# Patient Record
Sex: MALE | URBAN - METROPOLITAN AREA
[De-identification: names, ages, dates, MRNs, and addresses within clinical notes are randomized per-mention and may not be internally consistent; named-entity substitution may affect disease eponyms.]

---

## 2018-07-18 ENCOUNTER — EMERGENCY (EMERGENCY)
Facility: HOSPITAL | Age: 53
LOS: 1 days | Discharge: HOME | End: 2018-07-18
Attending: EMERGENCY MEDICINE | Admitting: EMERGENCY MEDICINE
Payer: OTHER MISCELLANEOUS

## 2018-07-18 VITALS
DIASTOLIC BLOOD PRESSURE: 103 MMHG | RESPIRATION RATE: 20 BRPM | OXYGEN SATURATION: 99 % | WEIGHT: 220.02 LBS | TEMPERATURE: 98 F | HEART RATE: 63 BPM | SYSTOLIC BLOOD PRESSURE: 160 MMHG

## 2018-07-18 PROCEDURE — 73562 X-RAY EXAM OF KNEE 3: CPT | Mod: 26,RT

## 2018-07-18 PROCEDURE — 99283 EMERGENCY DEPT VISIT LOW MDM: CPT | Mod: 25

## 2018-07-18 RX ORDER — IBUPROFEN 200 MG
600 TABLET ORAL ONCE
Refills: 0 | Status: COMPLETED | OUTPATIENT
Start: 2018-07-18 | End: 2018-07-18

## 2018-07-18 RX ADMIN — Medication 600 MILLIGRAM(S): at 19:51

## 2018-07-18 NOTE — ED PROVIDER NOTE - DIAGNOSTIC INTERPRETATION
ER Physician: Shira Mckeon   INTERPRETATION:  no acute fracture; no soft tissue swelling noted; +ARTHRITIS

## 2018-07-18 NOTE — ED PROVIDER NOTE - OBJECTIVE STATEMENT
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 No f/c, no redness.

## 2018-07-18 NOTE — ED ADULT TRIAGE NOTE - CHIEF COMPLAINT QUOTE
pt c/o knee pain, denies falls or recent injuries,. full ROM . pt reports hx htn, took his daily meds today.

## 2018-07-18 NOTE — ED ADULT NURSE NOTE - OBJECTIVE STATEMENT
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 Patient denies any fall/injuries, fevers. Tenderness on palpation.

## 2018-07-18 NOTE — ED PROVIDER NOTE - MEDICAL DECISION MAKING DETAILS
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 XR no fracture, +arthritis, recommend RICE and ortho/PMD follow up.

## 2018-07-18 NOTE — ED PROVIDER NOTE - PHYSICAL EXAMINATION
GEN: Well appearing, well nourished, awake, alert, oriented to person, place, time/situation and in no apparent distress.  ENT: Airway patent, Nasal mucosa clear. Mouth with normal mucosa.  EYES: Clear bilaterally.  RESPIRATORY: Breathing comfortably with normal RR.  CARDIAC: Regular rate and rhythm  ABDOMEN: Soft, nontender, +bowel sounds, no rebound, rigidity, or guarding.  MSK: Range of motion is not limited, no deformities noted. Mild TTP anteriorly, No effusion, no lig instability, compartments soft, no calf TTP, NVI with +2 pulses.  NEURO: Alert and oriented, no focal deficits.  SKIN: Skin normal color for race, warm, dry and intact. No evidence of rash.  PSYCH: Alert and oriented to person, place, time/situation. normal mood and affect. no apparent risk to self or others.

## 2018-07-19 PROBLEM — Z00.00 ENCOUNTER FOR PREVENTIVE HEALTH EXAMINATION: Status: ACTIVE | Noted: 2018-07-19

## 2018-07-22 DIAGNOSIS — M25.561 PAIN IN RIGHT KNEE: ICD-10-CM

## 2018-07-22 DIAGNOSIS — M19.90 UNSPECIFIED OSTEOARTHRITIS, UNSPECIFIED SITE: ICD-10-CM

## 2018-07-22 DIAGNOSIS — I10 ESSENTIAL (PRIMARY) HYPERTENSION: ICD-10-CM

## 2018-07-24 PROBLEM — Z00.00 ENCOUNTER FOR PREVENTIVE HEALTH EXAMINATION: Noted: 2018-07-24

## 2018-07-25 ENCOUNTER — MESSAGE (OUTPATIENT)
Age: 53
End: 2018-07-25

## 2018-07-26 ENCOUNTER — APPOINTMENT (OUTPATIENT)
Dept: ORTHOPEDIC SURGERY | Facility: CLINIC | Age: 53
End: 2018-07-26
Payer: OTHER MISCELLANEOUS

## 2018-07-26 VITALS — WEIGHT: 220 LBS | BODY MASS INDEX: 31.5 KG/M2 | HEIGHT: 70 IN

## 2018-07-26 PROCEDURE — 73562 X-RAY EXAM OF KNEE 3: CPT | Mod: RT

## 2018-07-26 PROCEDURE — 20610 DRAIN/INJ JOINT/BURSA W/O US: CPT | Mod: RT

## 2018-07-26 PROCEDURE — 99204 OFFICE O/P NEW MOD 45 MIN: CPT | Mod: 25

## 2018-07-26 RX ORDER — METHYLPRED ACET/NACL,ISO-OS/PF 40 MG/ML
40 VIAL (ML) INJECTION
Qty: 1 | Refills: 0 | Status: ACTIVE | COMMUNITY
Start: 2018-07-26

## 2018-08-02 PROCEDURE — 99283 EMERGENCY DEPT VISIT LOW MDM: CPT

## 2018-08-02 PROCEDURE — 99284 EMERGENCY DEPT VISIT MOD MDM: CPT | Mod: 25

## 2018-08-02 PROCEDURE — 73562 X-RAY EXAM OF KNEE 3: CPT

## 2018-08-07 ENCOUNTER — OTHER (OUTPATIENT)
Age: 53
End: 2018-08-07

## 2018-08-07 DIAGNOSIS — M17.11 UNILATERAL PRIMARY OSTEOARTHRITIS, RIGHT KNEE: ICD-10-CM

## 2018-09-02 ENCOUNTER — MOBILE ON CALL (OUTPATIENT)
Age: 53
End: 2018-09-02

## 2018-09-06 ENCOUNTER — APPOINTMENT (OUTPATIENT)
Dept: ORTHOPEDIC SURGERY | Facility: CLINIC | Age: 53
End: 2018-09-06

## 2018-09-26 ENCOUNTER — MESSAGE (OUTPATIENT)
Age: 53
End: 2018-09-26

## 2019-11-25 ENCOUNTER — OUTPATIENT (OUTPATIENT)
Dept: OUTPATIENT SERVICES | Facility: HOSPITAL | Age: 54
LOS: 1 days | End: 2019-11-25
Payer: COMMERCIAL

## 2019-11-25 PROCEDURE — 73560 X-RAY EXAM OF KNEE 1 OR 2: CPT

## 2019-11-25 PROCEDURE — 73560 X-RAY EXAM OF KNEE 1 OR 2: CPT | Mod: 26,RT

## 2019-11-26 ENCOUNTER — TRANSCRIPTION ENCOUNTER (OUTPATIENT)
Age: 54
End: 2019-11-26

## 2019-12-12 RX ORDER — CHLORHEXIDINE GLUCONATE 213 G/1000ML
1 SOLUTION TOPICAL ONCE
Refills: 0 | Status: COMPLETED | OUTPATIENT
Start: 2019-12-13 | End: 2019-12-13

## 2019-12-12 RX ORDER — OXYCODONE HYDROCHLORIDE 5 MG/1
20 TABLET ORAL ONCE
Refills: 0 | Status: DISCONTINUED | OUTPATIENT
Start: 2019-12-13 | End: 2019-12-13

## 2019-12-12 RX ORDER — CELECOXIB 200 MG/1
200 CAPSULE ORAL ONCE
Refills: 0 | Status: COMPLETED | OUTPATIENT
Start: 2019-12-13 | End: 2019-12-13

## 2019-12-12 RX ORDER — POVIDONE-IODINE 5 %
1 AEROSOL (ML) TOPICAL ONCE
Refills: 0 | Status: COMPLETED | OUTPATIENT
Start: 2019-12-13 | End: 2019-12-13

## 2019-12-12 RX ORDER — INFLUENZA VIRUS VACCINE 15; 15; 15; 15 UG/.5ML; UG/.5ML; UG/.5ML; UG/.5ML
0.5 SUSPENSION INTRAMUSCULAR ONCE
Refills: 0 | Status: DISCONTINUED | OUTPATIENT
Start: 2019-12-13 | End: 2019-12-17

## 2019-12-12 NOTE — H&P ADULT - NSHPPHYSICALEXAM_GEN_ALL_CORE
Right knee decreased rom 2/2 pain  Rest of PE per MD clearance General: NAD  PE: RIGHT LE skin intact, no erythema/ecchymosis/sts. SLT INTACT, DP/PT 2+, EHL/TA/GS 5/5. Right knee decreased rom 2/2 pain  Rest of PE per MD clearance

## 2019-12-12 NOTE — PATIENT PROFILE ADULT - NSPROEDALEARNPREF_GEN_A_NUR
written material/individual instruction/verbal instruction individual instruction/skill demonstration/verbal instruction/written material

## 2019-12-12 NOTE — H&P ADULT - PROBLEM SELECTOR PLAN 1
Admit to Ortho  For elective Right TKR  Cleared by Admit to Ortho  For elective Right TKR  Cleared by Dr. Bravo.

## 2019-12-12 NOTE — H&P ADULT - NSICDXPASTSURGICALHX_GEN_ALL_CORE_FT
PAST SURGICAL HISTORY:  History of lumbar laminectomy     History of shoulder surgery right, RCR repair

## 2019-12-12 NOTE — H&P ADULT - NSHPLABSRESULTS_GEN_ALL_CORE
preop cbc/bmp/coags/ua wnl per medical clearance  EKG- RSR in V1 AND V2  Preop chest x-ray wnl per clearance   nares dos

## 2019-12-12 NOTE — H&P ADULT - HISTORY OF PRESENT ILLNESS
54M c/o right knee pain  Presents today for elective right TKR. 54M c/o right knee pain x2y. Pt. denies any injury, but states he has arthritis. Pt. also thinks due to wear and tear of being a . Pt. c/o  pain in right knee with radiation up and down leg. Pt. has increased pain with walking. Pt. has been trying conservative therapies such as ice, Estim, and Tylenol for pain relief. Pt. also had ne cortisone injection July 2018 which helped with pan x 3 months. Denies any walker assistance.  Presents today for elective right TKR.

## 2019-12-13 ENCOUNTER — INPATIENT (INPATIENT)
Facility: HOSPITAL | Age: 54
LOS: 3 days | Discharge: HOME | DRG: 470 | End: 2019-12-17
Attending: ORTHOPAEDIC SURGERY | Admitting: ORTHOPAEDIC SURGERY
Payer: OTHER MISCELLANEOUS

## 2019-12-13 ENCOUNTER — RESULT REVIEW (OUTPATIENT)
Age: 54
End: 2019-12-13

## 2019-12-13 VITALS
DIASTOLIC BLOOD PRESSURE: 90 MMHG | WEIGHT: 219.14 LBS | SYSTOLIC BLOOD PRESSURE: 129 MMHG | HEART RATE: 67 BPM | OXYGEN SATURATION: 97 % | HEIGHT: 70 IN | RESPIRATION RATE: 16 BRPM | TEMPERATURE: 98 F

## 2019-12-13 DIAGNOSIS — Z98.890 OTHER SPECIFIED POSTPROCEDURAL STATES: Chronic | ICD-10-CM

## 2019-12-13 DIAGNOSIS — M17.11 UNILATERAL PRIMARY OSTEOARTHRITIS, RIGHT KNEE: ICD-10-CM

## 2019-12-13 DIAGNOSIS — I10 ESSENTIAL (PRIMARY) HYPERTENSION: ICD-10-CM

## 2019-12-13 LAB
MRSA PCR RESULT.: NEGATIVE — SIGNIFICANT CHANGE UP
S AUREUS DNA NOSE QL NAA+PROBE: POSITIVE

## 2019-12-13 PROCEDURE — 73560 X-RAY EXAM OF KNEE 1 OR 2: CPT | Mod: 26,RT

## 2019-12-13 PROCEDURE — 88300 SURGICAL PATH GROSS: CPT | Mod: 26

## 2019-12-13 RX ORDER — AMLODIPINE BESYLATE 2.5 MG/1
5 TABLET ORAL DAILY
Refills: 0 | Status: DISCONTINUED | OUTPATIENT
Start: 2019-12-13 | End: 2019-12-17

## 2019-12-13 RX ORDER — SODIUM CHLORIDE 9 MG/ML
1000 INJECTION, SOLUTION INTRAVENOUS
Refills: 0 | Status: DISCONTINUED | OUTPATIENT
Start: 2019-12-13 | End: 2019-12-17

## 2019-12-13 RX ORDER — ONDANSETRON 8 MG/1
4 TABLET, FILM COATED ORAL EVERY 6 HOURS
Refills: 0 | Status: DISCONTINUED | OUTPATIENT
Start: 2019-12-13 | End: 2019-12-17

## 2019-12-13 RX ORDER — POLYETHYLENE GLYCOL 3350 17 G/17G
17 POWDER, FOR SOLUTION ORAL DAILY
Refills: 0 | Status: DISCONTINUED | OUTPATIENT
Start: 2019-12-13 | End: 2019-12-17

## 2019-12-13 RX ORDER — OXYCODONE HYDROCHLORIDE 5 MG/1
10 TABLET ORAL EVERY 4 HOURS
Refills: 0 | Status: DISCONTINUED | OUTPATIENT
Start: 2019-12-13 | End: 2019-12-17

## 2019-12-13 RX ORDER — CEFAZOLIN SODIUM 1 G
2000 VIAL (EA) INJECTION EVERY 8 HOURS
Refills: 0 | Status: DISCONTINUED | OUTPATIENT
Start: 2019-12-13 | End: 2019-12-13

## 2019-12-13 RX ORDER — HYDROMORPHONE HYDROCHLORIDE 2 MG/ML
0.5 INJECTION INTRAMUSCULAR; INTRAVENOUS; SUBCUTANEOUS
Refills: 0 | Status: DISCONTINUED | OUTPATIENT
Start: 2019-12-13 | End: 2019-12-17

## 2019-12-13 RX ORDER — OXYCODONE HYDROCHLORIDE 5 MG/1
5 TABLET ORAL EVERY 4 HOURS
Refills: 0 | Status: DISCONTINUED | OUTPATIENT
Start: 2019-12-13 | End: 2019-12-17

## 2019-12-13 RX ORDER — ASPIRIN/CALCIUM CARB/MAGNESIUM 324 MG
325 TABLET ORAL
Refills: 0 | Status: DISCONTINUED | OUTPATIENT
Start: 2019-12-14 | End: 2019-12-17

## 2019-12-13 RX ORDER — BUPIVACAINE 13.3 MG/ML
20 INJECTION, SUSPENSION, LIPOSOMAL INFILTRATION ONCE
Refills: 0 | Status: DISCONTINUED | OUTPATIENT
Start: 2019-12-13 | End: 2019-12-17

## 2019-12-13 RX ORDER — SENNA PLUS 8.6 MG/1
2 TABLET ORAL AT BEDTIME
Refills: 0 | Status: DISCONTINUED | OUTPATIENT
Start: 2019-12-13 | End: 2019-12-17

## 2019-12-13 RX ORDER — HYDROCHLOROTHIAZIDE 25 MG
12.5 TABLET ORAL DAILY
Refills: 0 | Status: DISCONTINUED | OUTPATIENT
Start: 2019-12-13 | End: 2019-12-17

## 2019-12-13 RX ORDER — MAGNESIUM HYDROXIDE 400 MG/1
30 TABLET, CHEWABLE ORAL DAILY
Refills: 0 | Status: DISCONTINUED | OUTPATIENT
Start: 2019-12-13 | End: 2019-12-17

## 2019-12-13 RX ORDER — HYDROMORPHONE HYDROCHLORIDE 2 MG/ML
0.5 INJECTION INTRAMUSCULAR; INTRAVENOUS; SUBCUTANEOUS EVERY 4 HOURS
Refills: 0 | Status: DISCONTINUED | OUTPATIENT
Start: 2019-12-13 | End: 2019-12-17

## 2019-12-13 RX ORDER — ACETAMINOPHEN 500 MG
650 TABLET ORAL EVERY 6 HOURS
Refills: 0 | Status: DISCONTINUED | OUTPATIENT
Start: 2019-12-13 | End: 2019-12-17

## 2019-12-13 RX ORDER — PANTOPRAZOLE SODIUM 20 MG/1
40 TABLET, DELAYED RELEASE ORAL
Refills: 0 | Status: DISCONTINUED | OUTPATIENT
Start: 2019-12-13 | End: 2019-12-17

## 2019-12-13 RX ORDER — VALSARTAN 80 MG/1
160 TABLET ORAL DAILY
Refills: 0 | Status: DISCONTINUED | OUTPATIENT
Start: 2019-12-14 | End: 2019-12-17

## 2019-12-13 RX ORDER — CEFAZOLIN SODIUM 1 G
2000 VIAL (EA) INJECTION EVERY 8 HOURS
Refills: 0 | Status: COMPLETED | OUTPATIENT
Start: 2019-12-13 | End: 2019-12-13

## 2019-12-13 RX ADMIN — Medication 2000 MILLIGRAM(S): at 16:47

## 2019-12-13 RX ADMIN — ONDANSETRON 4 MILLIGRAM(S): 8 TABLET, FILM COATED ORAL at 13:55

## 2019-12-13 RX ADMIN — SODIUM CHLORIDE 100 MILLILITER(S): 9 INJECTION, SOLUTION INTRAVENOUS at 11:26

## 2019-12-13 RX ADMIN — CHLORHEXIDINE GLUCONATE 1 APPLICATION(S): 213 SOLUTION TOPICAL at 05:30

## 2019-12-13 RX ADMIN — CELECOXIB 200 MILLIGRAM(S): 200 CAPSULE ORAL at 07:23

## 2019-12-13 RX ADMIN — AMLODIPINE BESYLATE 5 MILLIGRAM(S): 2.5 TABLET ORAL at 16:58

## 2019-12-13 RX ADMIN — OXYCODONE HYDROCHLORIDE 20 MILLIGRAM(S): 5 TABLET ORAL at 07:27

## 2019-12-13 RX ADMIN — OXYCODONE HYDROCHLORIDE 10 MILLIGRAM(S): 5 TABLET ORAL at 14:47

## 2019-12-13 RX ADMIN — Medication 650 MILLIGRAM(S): at 15:40

## 2019-12-13 RX ADMIN — Medication 2000 MILLIGRAM(S): at 23:27

## 2019-12-13 RX ADMIN — OXYCODONE HYDROCHLORIDE 10 MILLIGRAM(S): 5 TABLET ORAL at 21:11

## 2019-12-13 RX ADMIN — CELECOXIB 200 MILLIGRAM(S): 200 CAPSULE ORAL at 07:27

## 2019-12-13 RX ADMIN — OXYCODONE HYDROCHLORIDE 10 MILLIGRAM(S): 5 TABLET ORAL at 15:40

## 2019-12-13 RX ADMIN — Medication 650 MILLIGRAM(S): at 14:47

## 2019-12-13 RX ADMIN — Medication 100 MILLIGRAM(S): at 07:22

## 2019-12-13 RX ADMIN — OXYCODONE HYDROCHLORIDE 10 MILLIGRAM(S): 5 TABLET ORAL at 22:11

## 2019-12-13 RX ADMIN — Medication 1 APPLICATION(S): at 06:15

## 2019-12-13 RX ADMIN — OXYCODONE HYDROCHLORIDE 20 MILLIGRAM(S): 5 TABLET ORAL at 07:22

## 2019-12-13 NOTE — PROGRESS NOTE ADULT - SUBJECTIVE AND OBJECTIVE BOX
POST OPERATIVE DAY #: 0  STATUS POST: Right knee replacement    SUBJECTIVE: Patient seen and examined. States to mild pain controlled. Patient denies any CP, SOB, fever, chills, numbness/tingling.     Pain:  well controlled      OBJECTIVE:     Vital Signs Last 24 Hrs  T(C): 36.3 (13 Dec 2019 13:45), Max: 36.5 (13 Dec 2019 05:48)  T(F): 97.3 (13 Dec 2019 13:45), Max: 97.7 (13 Dec 2019 05:48)  HR: 60 (13 Dec 2019 13:45) (60 - 78)  BP: 133/92 (13 Dec 2019 13:45) (109/76 - 133/92)  BP(mean): 94 (13 Dec 2019 12:15) (87 - 94)  RR: 15 (13 Dec 2019 13:45) (12 - 20)  SpO2: 100% (13 Dec 2019 13:45) (93% - 100%)    Affected extremity:          Dressing: clean/dry/intact          Sensation: intact to light touch         Motor exam:  5/5 to EHL/TA/GS         warm, well-perfused; capillary refill < 3 seconds              I&O's Detail    13 Dec 2019 07:01  -  13 Dec 2019 15:50  --------------------------------------------------------  IN:    lactated ringers.: 200 mL    Oral Fluid: 220 mL  Total IN: 420 mL    OUT:  Total OUT: 0 mL    Total NET: 420 mL          LABS:                MEDICATIONS:  ceFAZolin  Injectable. 2000 milliGRAM(s) IV Push every 8 hours    acetaminophen   Tablet .. 650 milliGRAM(s) Oral every 6 hours PRN  HYDROmorphone  Injectable 0.5 milliGRAM(s) IV Push every 4 hours PRN  HYDROmorphone  Injectable 0.5 milliGRAM(s) IV Push every 15 minutes PRN  ondansetron Injectable 4 milliGRAM(s) IV Push every 6 hours PRN  oxyCODONE    IR 5 milliGRAM(s) Oral every 4 hours PRN  oxyCODONE    IR 10 milliGRAM(s) Oral every 4 hours PRN        RADIOLOGY & ADDITIONAL STUDIES: s/p Right TKR. Hardware is intact. in good alignment.     ASSESSMENT AND PLAN:     1. Analgesic pain control  2. DVT prophylaxis: ASA       SCDs        3. Continue PT  4. Weight Bearing Status:  Weight bearing as tolerated        5. Disposition: Pending

## 2019-12-13 NOTE — BRIEF OPERATIVE NOTE - NSICDXBRIEFPREOP_GEN_ALL_CORE_FT
PRE-OP DIAGNOSIS:  Primary osteoarthritis of right knee 13-Dec-2019 10:49:51 Primary osteoarthritis of right knee Claudia Goff

## 2019-12-13 NOTE — BRIEF OPERATIVE NOTE - NSICDXBRIEFPOSTOP_GEN_ALL_CORE_FT
POST-OP DIAGNOSIS:  Primary osteoarthritis of right knee 13-Dec-2019 10:49:55 Primary osteoarthritis of right knee Claudia Goff

## 2019-12-14 ENCOUNTER — TRANSCRIPTION ENCOUNTER (OUTPATIENT)
Age: 54
End: 2019-12-14

## 2019-12-14 LAB
ANION GAP SERPL CALC-SCNC: 7 MMOL/L — SIGNIFICANT CHANGE UP (ref 5–17)
BUN SERPL-MCNC: 10 MG/DL — SIGNIFICANT CHANGE UP (ref 7–23)
CALCIUM SERPL-MCNC: 8.5 MG/DL — SIGNIFICANT CHANGE UP (ref 8.4–10.5)
CHLORIDE SERPL-SCNC: 101 MMOL/L — SIGNIFICANT CHANGE UP (ref 96–108)
CO2 SERPL-SCNC: 30 MMOL/L — SIGNIFICANT CHANGE UP (ref 22–31)
CREAT SERPL-MCNC: 0.96 MG/DL — SIGNIFICANT CHANGE UP (ref 0.5–1.3)
GLUCOSE SERPL-MCNC: 91 MG/DL — SIGNIFICANT CHANGE UP (ref 70–99)
HCT VFR BLD CALC: 37.2 % — LOW (ref 39–50)
HGB BLD-MCNC: 11.9 G/DL — LOW (ref 13–17)
MCHC RBC-ENTMCNC: 28.9 PG — SIGNIFICANT CHANGE UP (ref 27–34)
MCHC RBC-ENTMCNC: 32 GM/DL — SIGNIFICANT CHANGE UP (ref 32–36)
MCV RBC AUTO: 90.3 FL — SIGNIFICANT CHANGE UP (ref 80–100)
NRBC # BLD: 0 /100 WBCS — SIGNIFICANT CHANGE UP (ref 0–0)
PLATELET # BLD AUTO: 175 K/UL — SIGNIFICANT CHANGE UP (ref 150–400)
POTASSIUM SERPL-MCNC: 3.8 MMOL/L — SIGNIFICANT CHANGE UP (ref 3.5–5.3)
POTASSIUM SERPL-SCNC: 3.8 MMOL/L — SIGNIFICANT CHANGE UP (ref 3.5–5.3)
RBC # BLD: 4.12 M/UL — LOW (ref 4.2–5.8)
RBC # FLD: 13.1 % — SIGNIFICANT CHANGE UP (ref 10.3–14.5)
SODIUM SERPL-SCNC: 138 MMOL/L — SIGNIFICANT CHANGE UP (ref 135–145)
WBC # BLD: 8.11 K/UL — SIGNIFICANT CHANGE UP (ref 3.8–10.5)
WBC # FLD AUTO: 8.11 K/UL — SIGNIFICANT CHANGE UP (ref 3.8–10.5)

## 2019-12-14 RX ORDER — OXYCODONE HYDROCHLORIDE 5 MG/1
1 TABLET ORAL
Qty: 42 | Refills: 0
Start: 2019-12-14 | End: 2019-12-20

## 2019-12-14 RX ORDER — ASPIRIN/CALCIUM CARB/MAGNESIUM 324 MG
1 TABLET ORAL
Qty: 56 | Refills: 0
Start: 2019-12-14 | End: 2020-01-10

## 2019-12-14 RX ORDER — SENNA PLUS 8.6 MG/1
2 TABLET ORAL
Qty: 0 | Refills: 0 | DISCHARGE
Start: 2019-12-14

## 2019-12-14 RX ADMIN — OXYCODONE HYDROCHLORIDE 10 MILLIGRAM(S): 5 TABLET ORAL at 11:00

## 2019-12-14 RX ADMIN — Medication 650 MILLIGRAM(S): at 11:00

## 2019-12-14 RX ADMIN — OXYCODONE HYDROCHLORIDE 10 MILLIGRAM(S): 5 TABLET ORAL at 10:05

## 2019-12-14 RX ADMIN — OXYCODONE HYDROCHLORIDE 10 MILLIGRAM(S): 5 TABLET ORAL at 17:10

## 2019-12-14 RX ADMIN — PANTOPRAZOLE SODIUM 40 MILLIGRAM(S): 20 TABLET, DELAYED RELEASE ORAL at 05:17

## 2019-12-14 RX ADMIN — ONDANSETRON 4 MILLIGRAM(S): 8 TABLET, FILM COATED ORAL at 10:06

## 2019-12-14 RX ADMIN — POLYETHYLENE GLYCOL 3350 17 GRAM(S): 17 POWDER, FOR SOLUTION ORAL at 10:05

## 2019-12-14 RX ADMIN — Medication 650 MILLIGRAM(S): at 10:05

## 2019-12-14 RX ADMIN — OXYCODONE HYDROCHLORIDE 10 MILLIGRAM(S): 5 TABLET ORAL at 18:10

## 2019-12-14 RX ADMIN — AMLODIPINE BESYLATE 5 MILLIGRAM(S): 2.5 TABLET ORAL at 05:17

## 2019-12-14 RX ADMIN — Medication 325 MILLIGRAM(S): at 17:10

## 2019-12-14 RX ADMIN — Medication 325 MILLIGRAM(S): at 05:17

## 2019-12-14 RX ADMIN — VALSARTAN 160 MILLIGRAM(S): 80 TABLET ORAL at 05:17

## 2019-12-14 NOTE — DISCHARGE NOTE PROVIDER - CARE PROVIDER_API CALL
Toy Jang)  Orthopaedic Surgery  [address], [place] [address]  Phone: [phone]  Fax: [phone]  Follow Up Time: Toy Jang)  Orthopaedic Surgery  [address] [address]  Phone: [phone]  Fax: [phone]  Follow Up Time: 2 weeks

## 2019-12-14 NOTE — PROGRESS NOTE ADULT - SUBJECTIVE AND OBJECTIVE BOX
POST OPERATIVE DAY #: 1  STATUS POST: Right knee replacement    SUBJECTIVE: Patient seen and examined. States to mild pain controlled. Patient denies any CP, SOB, fever, chills, numbness/tingling.     Pain:  well controlled      OBJECTIVE:     Vital Signs Last 24 Hrs  T(C): 36.5 (14 Dec 2019 08:23), Max: 36.8 (14 Dec 2019 05:09)  T(F): 97.7 (14 Dec 2019 08:23), Max: 98.2 (14 Dec 2019 05:09)  HR: 72 (14 Dec 2019 08:23) (58 - 89)  BP: 127/80 (14 Dec 2019 08:23) (100/64 - 154/88)  BP(mean): 94 (13 Dec 2019 12:15) (87 - 94)  RR: 15 (14 Dec 2019 08:23) (12 - 20)  SpO2: 97% (14 Dec 2019 08:23) (93% - 100%)    GENL: NAD, AOx3  RLE         Dressing: clean/dry/intact          Sensation: intact to light touch         Motor exam:  5/5 to EHL/TA/GS         warm, well-perfused; capillary refill < 3 seconds                        ASSESSMENT AND PLAN:   54M POD1 s/p R TKA  1. Analgesic pain control  2. DVT prophylaxis: ASA       SCDs        3. Continue PT  4. Weight Bearing Status:  Weight bearing as tolerated        5. Disposition: home vs [facility] pending PT

## 2019-12-14 NOTE — DISCHARGE NOTE PROVIDER - NSDCMRMEDTOKEN_GEN_ALL_CORE_FT
aspirin 325 mg oral delayed release tablet: 1 tab(s) orally 2 times a day MDD:2  Exforge HCT 5 mg-160 mg-12.5 mg oral tablet: 1 tab(s) orally once a day  senna oral tablet: 2 tab(s) orally once a day (at bedtime), As needed, Constipation

## 2019-12-14 NOTE — DISCHARGE NOTE PROVIDER - NSDCCPTREATMENT_GEN_ALL_CORE_FT
PRINCIPAL PROCEDURE  Procedure: Total knee replacement  Findings and Treatment: Right total knee replacement

## 2019-12-14 NOTE — DISCHARGE NOTE PROVIDER - NSDCFUADDINST_GEN_ALL_CORE_FT
No strenuous activity, heavy lifting, driving or returning to work until cleared by MD.  You may shower - dressing is water-resistant, no soaking in bathtubs.  Remove dressing after post op day 5-7, then leave incision open to air. Keep incision clean and dry.  Try to have regular bowel movements, take stool softener or laxative if necessary.    Swelling may travel all the way down leg to foot, this is normal and will subside in a few weeks.  Call to schedule an appt with Dr. Jang for follow up, if you have staples or sutures they will be removed in office.  Contact your doctor if you experience: fever greater than 101.5, chills, chest pain, difficulty breathing, redness or excessive drainage around the incision, other concerns.

## 2019-12-14 NOTE — DISCHARGE NOTE PROVIDER - NSDCACTIVITY_GEN_ALL_CORE
Showering allowed/Walking - Indoors allowed/No heavy lifting/straining/Do not drive or operate machinery

## 2019-12-14 NOTE — DISCHARGE NOTE PROVIDER - HOSPITAL COURSE
Admitted    Surgery RIGHT TKR 12/13/19    Nargis-op Antibiotics    Pain control    DVT prophylaxis    OOB/Physical Therapy

## 2019-12-15 RX ADMIN — OXYCODONE HYDROCHLORIDE 10 MILLIGRAM(S): 5 TABLET ORAL at 07:12

## 2019-12-15 RX ADMIN — Medication 325 MILLIGRAM(S): at 05:22

## 2019-12-15 RX ADMIN — OXYCODONE HYDROCHLORIDE 10 MILLIGRAM(S): 5 TABLET ORAL at 01:58

## 2019-12-15 RX ADMIN — PANTOPRAZOLE SODIUM 40 MILLIGRAM(S): 20 TABLET, DELAYED RELEASE ORAL at 05:22

## 2019-12-15 RX ADMIN — OXYCODONE HYDROCHLORIDE 10 MILLIGRAM(S): 5 TABLET ORAL at 15:15

## 2019-12-15 RX ADMIN — HYDROMORPHONE HYDROCHLORIDE 0.5 MILLIGRAM(S): 2 INJECTION INTRAMUSCULAR; INTRAVENOUS; SUBCUTANEOUS at 21:03

## 2019-12-15 RX ADMIN — OXYCODONE HYDROCHLORIDE 10 MILLIGRAM(S): 5 TABLET ORAL at 11:27

## 2019-12-15 RX ADMIN — HYDROMORPHONE HYDROCHLORIDE 0.5 MILLIGRAM(S): 2 INJECTION INTRAMUSCULAR; INTRAVENOUS; SUBCUTANEOUS at 09:56

## 2019-12-15 RX ADMIN — OXYCODONE HYDROCHLORIDE 10 MILLIGRAM(S): 5 TABLET ORAL at 15:50

## 2019-12-15 RX ADMIN — OXYCODONE HYDROCHLORIDE 10 MILLIGRAM(S): 5 TABLET ORAL at 23:57

## 2019-12-15 RX ADMIN — OXYCODONE HYDROCHLORIDE 10 MILLIGRAM(S): 5 TABLET ORAL at 19:45

## 2019-12-15 RX ADMIN — OXYCODONE HYDROCHLORIDE 10 MILLIGRAM(S): 5 TABLET ORAL at 11:57

## 2019-12-15 RX ADMIN — Medication 650 MILLIGRAM(S): at 22:03

## 2019-12-15 RX ADMIN — Medication 650 MILLIGRAM(S): at 01:00

## 2019-12-15 RX ADMIN — Medication 325 MILLIGRAM(S): at 19:04

## 2019-12-15 RX ADMIN — VALSARTAN 160 MILLIGRAM(S): 80 TABLET ORAL at 05:22

## 2019-12-15 RX ADMIN — Medication 650 MILLIGRAM(S): at 20:58

## 2019-12-15 RX ADMIN — AMLODIPINE BESYLATE 5 MILLIGRAM(S): 2.5 TABLET ORAL at 05:23

## 2019-12-15 RX ADMIN — Medication 12.5 MILLIGRAM(S): at 05:22

## 2019-12-15 RX ADMIN — HYDROMORPHONE HYDROCHLORIDE 0.5 MILLIGRAM(S): 2 INJECTION INTRAMUSCULAR; INTRAVENOUS; SUBCUTANEOUS at 10:26

## 2019-12-15 RX ADMIN — OXYCODONE HYDROCHLORIDE 10 MILLIGRAM(S): 5 TABLET ORAL at 00:58

## 2019-12-15 RX ADMIN — HYDROMORPHONE HYDROCHLORIDE 0.5 MILLIGRAM(S): 2 INJECTION INTRAMUSCULAR; INTRAVENOUS; SUBCUTANEOUS at 04:15

## 2019-12-15 RX ADMIN — Medication 650 MILLIGRAM(S): at 01:58

## 2019-12-15 RX ADMIN — OXYCODONE HYDROCHLORIDE 10 MILLIGRAM(S): 5 TABLET ORAL at 08:12

## 2019-12-15 RX ADMIN — HYDROMORPHONE HYDROCHLORIDE 0.5 MILLIGRAM(S): 2 INJECTION INTRAMUSCULAR; INTRAVENOUS; SUBCUTANEOUS at 21:18

## 2019-12-15 RX ADMIN — OXYCODONE HYDROCHLORIDE 10 MILLIGRAM(S): 5 TABLET ORAL at 19:59

## 2019-12-16 PROCEDURE — 93971 EXTREMITY STUDY: CPT | Mod: 26,RT

## 2019-12-16 PROCEDURE — 71045 X-RAY EXAM CHEST 1 VIEW: CPT | Mod: 26

## 2019-12-16 RX ORDER — SODIUM CHLORIDE 9 MG/ML
500 INJECTION INTRAMUSCULAR; INTRAVENOUS; SUBCUTANEOUS ONCE
Refills: 0 | Status: COMPLETED | OUTPATIENT
Start: 2019-12-16 | End: 2019-12-16

## 2019-12-16 RX ADMIN — PANTOPRAZOLE SODIUM 40 MILLIGRAM(S): 20 TABLET, DELAYED RELEASE ORAL at 05:32

## 2019-12-16 RX ADMIN — OXYCODONE HYDROCHLORIDE 10 MILLIGRAM(S): 5 TABLET ORAL at 09:03

## 2019-12-16 RX ADMIN — VALSARTAN 160 MILLIGRAM(S): 80 TABLET ORAL at 05:32

## 2019-12-16 RX ADMIN — MAGNESIUM HYDROXIDE 30 MILLILITER(S): 400 TABLET, CHEWABLE ORAL at 09:04

## 2019-12-16 RX ADMIN — SODIUM CHLORIDE 500 MILLILITER(S): 9 INJECTION INTRAMUSCULAR; INTRAVENOUS; SUBCUTANEOUS at 12:02

## 2019-12-16 RX ADMIN — Medication 325 MILLIGRAM(S): at 05:32

## 2019-12-16 RX ADMIN — POLYETHYLENE GLYCOL 3350 17 GRAM(S): 17 POWDER, FOR SOLUTION ORAL at 09:03

## 2019-12-16 RX ADMIN — OXYCODONE HYDROCHLORIDE 10 MILLIGRAM(S): 5 TABLET ORAL at 04:21

## 2019-12-16 RX ADMIN — OXYCODONE HYDROCHLORIDE 10 MILLIGRAM(S): 5 TABLET ORAL at 10:03

## 2019-12-16 RX ADMIN — Medication 325 MILLIGRAM(S): at 18:14

## 2019-12-16 RX ADMIN — OXYCODONE HYDROCHLORIDE 10 MILLIGRAM(S): 5 TABLET ORAL at 18:15

## 2019-12-16 RX ADMIN — OXYCODONE HYDROCHLORIDE 10 MILLIGRAM(S): 5 TABLET ORAL at 05:21

## 2019-12-16 RX ADMIN — Medication 650 MILLIGRAM(S): at 21:20

## 2019-12-16 RX ADMIN — AMLODIPINE BESYLATE 5 MILLIGRAM(S): 2.5 TABLET ORAL at 05:32

## 2019-12-16 RX ADMIN — OXYCODONE HYDROCHLORIDE 10 MILLIGRAM(S): 5 TABLET ORAL at 00:57

## 2019-12-16 RX ADMIN — SENNA PLUS 2 TABLET(S): 8.6 TABLET ORAL at 21:27

## 2019-12-16 RX ADMIN — Medication 12.5 MILLIGRAM(S): at 05:32

## 2019-12-16 NOTE — PROGRESS NOTE ADULT - SUBJECTIVE AND OBJECTIVE BOX
Orthopaedic Surgery Progress Note    Post-operative day #3 s/p R TKR    Subjective:     Patient seen and examined. Patient concerned about right leg swelling. He denies any increased pain, calf pain, chest pain, shortness of breath, N/V, numbness or tingling.    Objective:    Vital Signs Last 24 Hrs  T(C): 37.5 (12-16-19 @ 05:09), Max: 37.5 (12-16-19 @ 05:09)  T(F): 99.5 (12-16-19 @ 05:09), Max: 99.5 (12-16-19 @ 05:09)  HR: 98 (12-16-19 @ 05:09) (98 - 98)  BP: 146/84 (12-16-19 @ 05:09) (146/84 - 146/84)  RR: 16 (12-16-19 @ 05:09) (16 - 16)  SpO2: 96% (12-16-19 @ 05:09) (96% - 96%)  AVSS    PE:  General: Patient alert and oriented, NAD  Swelling to RLE. No calf tenderness.  Dressing: Clean/dry/intact aquacel  Pulses: 2+ DP BLE   Sensation: SILT BLE   Motor: EHL/FHL/TA/GS 5/5 BLE      A/P: 54yMale POD#3 s/p R TKR  1. Pain control as needed  2. DVT prophylaxis: ASA   3. PT, weight-bearing status: WBAT   4. Doppler to r/o DVT.   5. Dispo: Home with home PT if Doppler negative.    Ortho Pager [phone]

## 2019-12-16 NOTE — DIETITIAN INITIAL EVALUATION ADULT. - OTHER INFO
55yo M hx of HTN, ?Arthritis, History of lumbar laminectomy, History of shoulder surgery right; pt with c/p R knee pain  x2 years, admitted now for Primary osteoarthritis of right knee s/p TKR (12/13). Noted not yet cleared by PT, d/c pending. Per flow sheets, complains of pain/discomfort to R knee level 7. 2+ edema (Right thigh). +BM (12/13), noted constipation. No pressure ulcers; SX site noted.   Regular Diet / Clear Liquid diet (12/13). 55yo M hx of HTN, ?Arthritis, History of lumbar laminectomy, History of shoulder surgery right; pt with c/o R knee pain  x2 years, admitted now for Primary osteoarthritis of right knee s/p TKR (12/13). Noted not yet cleared by PT, d/c pending. Per flow sheets, complains of pain/discomfort to R knee level 7. 2+ edema (Right thigh). +BM (12/13), noted constipation; pt reports N/V initially s/p SX however has resolved. No pressure ulcers; SX site noted.   Regular diet PTA, good PO intake, No issues chewing/swallowing, NKFA. Regular Diet / Clear Liquid diet (12/13). Pt reports good PO intake at this time and reports getting solid foods.  pounds and reports being wt stable.   Discussed tips for constipation. Understanding stated, provide additional motivation as needed.   Please see below for nutritions recommendations. Recs made with team. 53yo M hx of HTN, ?Arthritis, History of lumbar laminectomy, History of shoulder surgery right; pt with c/o R knee pain  x2 years, admitted now for Primary osteoarthritis of right knee s/p TKR (12/13). Noted not yet cleared by PT, d/c pending. Per flow sheets, complains of pain/discomfort to R knee level 7. 2+ edema (Right thigh). +BM (12/13), noted constipation; pt reports N/V initially s/p SX however has resolved. No pressure ulcers; SX site noted.   Regular diet PTA, good PO intake, No issues chewing/swallowing, NKFA. Regular Diet / Clear Liquid diet (12/13). Pt reports good PO intake at this time and reports getting solid foods.  pounds and reports being wt stable.   Discussed tips for constipation. Understanding stated, provide additional motivation as needed.   Please see below for nutritions recommendations. Recs made with team. Pending order placed.

## 2019-12-16 NOTE — DIETITIAN INITIAL EVALUATION ADULT. - ADD RECOMMEND
1. Monitor PO intake/appetite, GI distress; had offered prunes / prune juice however pt declined, diet tolerance, labs, weights.  2. Honor pt food preferences as able.  3. RD to remain available for additional nutrition interventions as needed.

## 2019-12-16 NOTE — PROGRESS NOTE ADULT - SUBJECTIVE AND OBJECTIVE BOX
Ortho Note    Pt states he developed RLE swelling yesterday afternoon which has progressed today.   Denies CP, SOB, N/V, numbness/tingling     Vital Signs Last 24 Hrs  T(C): 37.5 (12-16-19 @ 05:09), Max: 37.5 (12-16-19 @ 05:09)  T(F): 99.5 (12-16-19 @ 05:09), Max: 99.5 (12-16-19 @ 05:09)  HR: 98 (12-16-19 @ 05:09) (98 - 98)  BP: 146/84 (12-16-19 @ 05:09) (146/84 - 146/84)  BP(mean): --  RR: 16 (12-16-19 @ 05:09) (16 - 16)  SpO2: 96% (12-16-19 @ 05:09) (96% - 96%)      General: Pt Alert and oriented, NAD  DSG C/D/I  Swelling over leg compared to contrallateral side  Pulses: 2+ DP pulse  Sensation: SILT over distal limb and symmetric to contralateral side  Motor: 5+ EHL/FHL/TA/GS                          11.9   8.11  )-----------( 175      ( 14 Dec 2019 07:32 )             37.2   14 Dec 2019 07:32    138    |  101    |  10     ----------------------------<  91     3.8     |  30     |  0.96         ASSESSMENT AND PLAN:   54M POD3 s/p R TKA  1. Analgesic pain control  2. Dopplers of RLE  3. CXR  4. DVT prophylaxis: ASA       SCDs        5. Continue PT  6. Weight Bearing Status:  Weight bearing as tolerated        7. Disposition: home     Ortho Pager [phone]

## 2019-12-17 ENCOUNTER — TRANSCRIPTION ENCOUNTER (OUTPATIENT)
Age: 54
End: 2019-12-17

## 2019-12-17 VITALS
HEART RATE: 105 BPM | TEMPERATURE: 98 F | OXYGEN SATURATION: 97 % | RESPIRATION RATE: 17 BRPM | SYSTOLIC BLOOD PRESSURE: 122 MMHG | DIASTOLIC BLOOD PRESSURE: 78 MMHG

## 2019-12-17 RX ORDER — MULTIVIT WITH MIN/MFOLATE/K2 340-15/3 G
1 POWDER (GRAM) ORAL ONCE
Refills: 0 | Status: COMPLETED | OUTPATIENT
Start: 2019-12-17 | End: 2019-12-17

## 2019-12-17 RX ADMIN — PANTOPRAZOLE SODIUM 40 MILLIGRAM(S): 20 TABLET, DELAYED RELEASE ORAL at 07:54

## 2019-12-17 RX ADMIN — OXYCODONE HYDROCHLORIDE 10 MILLIGRAM(S): 5 TABLET ORAL at 11:51

## 2019-12-17 RX ADMIN — Medication 10 MILLIGRAM(S): at 05:03

## 2019-12-17 RX ADMIN — Medication 650 MILLIGRAM(S): at 05:38

## 2019-12-17 RX ADMIN — Medication 1 BOTTLE: at 08:27

## 2019-12-17 RX ADMIN — OXYCODONE HYDROCHLORIDE 10 MILLIGRAM(S): 5 TABLET ORAL at 08:40

## 2019-12-17 RX ADMIN — AMLODIPINE BESYLATE 5 MILLIGRAM(S): 2.5 TABLET ORAL at 07:54

## 2019-12-17 RX ADMIN — OXYCODONE HYDROCHLORIDE 10 MILLIGRAM(S): 5 TABLET ORAL at 12:30

## 2019-12-17 RX ADMIN — OXYCODONE HYDROCHLORIDE 10 MILLIGRAM(S): 5 TABLET ORAL at 07:55

## 2019-12-17 RX ADMIN — Medication 12.5 MILLIGRAM(S): at 07:54

## 2019-12-17 RX ADMIN — Medication 325 MILLIGRAM(S): at 07:54

## 2019-12-20 DIAGNOSIS — M17.11 UNILATERAL PRIMARY OSTEOARTHRITIS, RIGHT KNEE: ICD-10-CM

## 2019-12-20 DIAGNOSIS — I10 ESSENTIAL (PRIMARY) HYPERTENSION: ICD-10-CM

## 2019-12-20 DIAGNOSIS — E66.9 OBESITY, UNSPECIFIED: ICD-10-CM

## 2019-12-20 DIAGNOSIS — K21.9 GASTRO-ESOPHAGEAL REFLUX DISEASE WITHOUT ESOPHAGITIS: ICD-10-CM

## 2019-12-20 LAB — SURGICAL PATHOLOGY STUDY: SIGNIFICANT CHANGE UP

## 2019-12-23 PROCEDURE — 73560 X-RAY EXAM OF KNEE 1 OR 2: CPT

## 2019-12-23 PROCEDURE — 36415 COLL VENOUS BLD VENIPUNCTURE: CPT

## 2019-12-23 PROCEDURE — 97110 THERAPEUTIC EXERCISES: CPT

## 2019-12-23 PROCEDURE — 97116 GAIT TRAINING THERAPY: CPT

## 2019-12-23 PROCEDURE — C1713: CPT

## 2019-12-23 PROCEDURE — 85027 COMPLETE CBC AUTOMATED: CPT

## 2019-12-23 PROCEDURE — C1776: CPT

## 2019-12-23 PROCEDURE — 80048 BASIC METABOLIC PNL TOTAL CA: CPT

## 2019-12-23 PROCEDURE — 88300 SURGICAL PATH GROSS: CPT

## 2019-12-23 PROCEDURE — 87641 MR-STAPH DNA AMP PROBE: CPT

## 2019-12-23 PROCEDURE — 93971 EXTREMITY STUDY: CPT

## 2019-12-23 PROCEDURE — 71045 X-RAY EXAM CHEST 1 VIEW: CPT

## 2020-01-26 NOTE — PHYSICAL THERAPY INITIAL EVALUATION ADULT - LEVEL OF CONSCIOUSNESS, REHAB EVAL
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     POSTOPERATIVE DIAGNOSIS:Anorectal abscess and intersphincteric fistula.     PROCEDURE: Examination under anesthesia, partial fistulotomy and seton placement     INDICATIONS: Tremayne Galarza is a 58 year old male who presented with a perirectal abscess which initially improved with drainage and then started to worsen after a little over a week. On flushing the cavity, fluid from the anus could be demonstrated which meant a fistula was likely. The risks and benefits of surgery were thoroughly discussed with the patient and he agreed to proceed.     DESCRIPTION OF PROCEDURE: The patient was brought to the operating room, placed prone on the operating room table. Deep sedation was induced with intravenous medicines with deep sedation and monitored anesthesia care. We prepped and draped the area in the usual sterile fashion. We began the procedure with a timeout and performed an anal block using a mixture 50/50 of bupivacaine and lidocaine with epinephrine. A total of 40 mL were infused and following this, we performed anoscopy which demonstrated the likely internal orifice directly posteriorly. There were also moderate sized nonbleeding internal hemorrhoids. The external opening using the lacrimal probe was used and easily directly communicated with the abscess cavity. The yellow vessel loop was placed and secured with 4 separate 2-0 silk sutures. A partial fistulotomy was performed to shorten the tract. At the end the case, all instruments and sponge counts were correct x2. The patient was emerged from anesthesia and taken to postoperative anesthesia care unit in good condition.     SPECIMEN: None.     ESTIMATED BLOOD LOSS: Minimal.     DRAINS: None.     URINE OUTPUT: Not measured.     JOSIAS MCGRATH MD   Colon and Rectal Surgery Staff  [facility]  [facility]       alert

## 2020-04-22 ENCOUNTER — TRANSCRIPTION ENCOUNTER (OUTPATIENT)
Age: 55
End: 2020-04-22

## 2020-06-02 RX ORDER — AMLODIPINE VALSARTAN AND HYDROCHLOROTHIAZIDE 10; 160; 25 MG/1; MG/1; MG/1
1 TABLET, FILM COATED ORAL
Qty: 0 | Refills: 0 | DISCHARGE

## 2020-07-20 NOTE — PATIENT PROFILE ADULT - HAVE YOU EXPERIENCED VIOLENCE OR A TRAUMATIC EVENT?
Post-operative Instructions for Surgery:     Wound/Incisional Care:   -Do not place anything in the vagina for 8 weeks.     These instructions are for anyone having laparoscopic surgery:  -You have 5 abdominal incisions ranging from 1/2cm to 2cm or longer  -If there is any oozing from an incision put pressure directly on the wound. If the wound is gushing yellow, smelly or bloody fluid or any incisions starts discharging spontaneously after 72 hours, call the office at [phone]  -If you notice redness around any of the incisions, temitope the circumference of the reddened area. Re-check the area in 12 hours, if the reddened area starts to get larger, call the office     Pain:   -The anesthetic injected at the time of surgery should last 3-6 hours.  -It is normal for the incision to be tender postoperatively.   -It is normal for your vagina to be tender postoperatively.    -Use the pain medication as necessary, but be aware that it will cause some degree of constipation.   -If the pain is less severe, Advil®/ibuprofen (up to 600mg every 6 hours) or Tylenol®/acetaminophen (up to 1000mg every 6 hours) should be substituted.  Please note that many narcotics come in a combination pill of the narcotic plus ibuprofen or narcotic plus acetaminophen.  If your narcotic has this, do NOT take both the narcotic and the ibuprofen or acetaminophen.  Please call our office if you are unsure.  -To avoid pain from abdominal distension secondary to gas (gas pain) remain as mobile as possible.   -If you had laparoscopic surgery, you may have aches and pains in your shoulders, arms and legs.  This should get better every day, but can take up to 6 weeks to resolve.    Vaginal Bleeding:   -Patients commonly experience vaginal bleeding postop, as long as it is not brisk and bright red, DON'T WORRY!   -If it is heavy and you are concerned put on a clean maxi pad immediately and over an hour quantify the amount of blood so you can report it  to the doctor-on-call, for example, the blood is the size of a quarter, \"peppermint shweta\" or soaked through the pad!    Fever:   -A low grade temperature is not uncommon after surgery.  -A true fever is considered to be an oral temperature over 100.4 degrees despite what your baseline temperature is; if you do have a temperature greater than 100.4F, re-take your temperature after 4 hours, if still above 100.4 degrees, call the office.     Call your physician if you have:  -Leg pain, swelling, or redness.    -Difficulty urinating  -Severe abdominal pain or bloating  -Chest pain or shortness of breath  -Fainting    Diet:   -Eat a healthy diet rich in fiber.   -Drink lots of fluids: water and juices.    Bowel Movements:  -It is common not to move your bowels 2 or 3 days after surgery  -It is important to avoid becoming constipated   -High fiber diet and fluids are essential  -We recommend starting miralax 17 grams of powder dissolved in a glass of liquid of your choice starting two days PRIOR to surgery and continue for one week after surgery  -If you do not have a bowel movement by the third day after surgery, please take 2 tablespoons of Milk of Magnesia.  If you do not have a bowel movement by the fourth day, please call our office  -The first bowel movement sometimes HURTS!  We have found that deep breathing and simply relaxing is very helpful in passing this first bowel movement after surgery.     Activity:  In order to get you back to pre-surgical function as soon as possible, we do ask that you ambulate the night of surgery. This may be just a few steps in your room or walking in the hallway, whatever you feel able to do.   The nursing staff with assist you.   -You may resume activities of daily living as soon as you feel up to it   -Do not recommence exercise until after the 2-3 week postop follow up with the doctor (or PA).  You have a 10lbs lifting restriction for 2 weeks, then 20lbs for an additional 6  weeks (which is 8 weeks after your surgery).    -Generally, you can return to work at 3 weeks if you work a desk job or other job without heavy lifting, 6 weeks if you perform a physical job, 8 weeks if you perform repetitive, heavy lifting  -You can shower starting the day after surgery, but no baths for 3 weeks.  You should not use a hot tub or go swimming for 6 weeks after surgery.  However you may sit in a swimming pool at 4 weeks.  -You are also permitted to drive limited distances once off narcotics for at least 24 hours and when you feel strong enough to apply firm pressure to the brake.  -You may climb stairs to get in and out of your home starting the day after surgery as long as you take them slow and one at a time  -It is best to plan NOT to fly or drive a long distance (greater than 2 hour drive) for about one month post operatively.     Additional Information   -If you are admitted to the hospital overnight, your surgeon will likely order scheduled Tylenol and Toradol (which is a similar to ibuprofen in an IV form).  Giving these medications help reduce inflammation after surgery, and by reducing inflammation we are hoping to reduce the need for stronger pain medications.  -Your two to four week postop appointment should be scheduled at the time we schedule your surgery  -Feel free to call us at [phone] if you have any questions or concerns   -An answering service is available for emergency calls after office hours at [phone]  Even though it is typical to feel well within 2-4 weeks of having surgery, you will need to limit any vigorous physical activity for at least 8 weeks after your surgery.  This includes household activities such as laundry, vacuuming, cutting grass, shoveling snow and grocery shopping that requires pushing and pulling.  Lifting is restricted to 10 lbs. (roughly a gallon of milk) for 2 weeks after surgery and then 20lbs for an additional 6 weeks (8 weeks total of lifting  restriction).   Repetitive lifting, biking, motorcycle riding, golfing, horseback riding, intense exercise, and sit ups should be avoided for 8-12 weeks.  Your body needs time to heal and if you return to the above listed activity there is risk of damaging the repair. [place] typically may be resumed 6-8 weeks after surgery, but please discuss with your doctor.      Liz Muhammad MD.  [facility]     [address] [place] [address]  Phone: [phone]    Instruct Patient:   - Pelvic rest x 8 weeks   - To call Physician for any signs of wound infection: Fever greater than 101 degrees Fahrenheit, bleeding, separation of incision, pus like drainage, or excessive swelling, excessive vaginal bleeding, or saturating pad more often than every hour   - To call Physician for difficulty breathing, vomiting, inability to tolerate oral intake   - To call Physician for any pain that is not controlled by pain medication or anything worrisome   - To avoid driving while taking pain medications or experiencing pain       Care After Anesthesia or Sedation    After Discharge  · Due to the medicine given, someone must drive you home.  If possible, have someone stay with you at home the day of discharge and the night after surgery.  · If you have infants or small children at home, please have someone help you for at least 24 hours after your surgery.  · Do not drive for at least 24 hours after surgery (or as told by your doctor).  · Rest for the remainder of the day. Go up and down stairs slowly.  · Do not smoke after surgery.  Smoking can delay healing.  · Do not operate heavy or potential harmful equipment.  · Do not make legally binding decisions.  · Do no drink alcohol for 24 hours.    Diet  · Nausea may be expected for the first 24 to 48 hours.  Start eating a bland diet (toast, gelatin, 7-up, hot cereal, crackers, sherbet, broth, soup).  · Drink plenty of fluids (6 to 8 glasses of  water).  · Resume your regular diet as able.  · Avoid greasy or spicy foods for 24 hours.    Urination  · The effects of anesthetics may cause some people to have trouble passing urine the day of surgery.  Drink a lot of fluids to help prevent this.  · Try to urinate within 12 hours of surgery.  · If you are unable to pass urine and feel like you need to, call your doctor or the hospital.    Pain Control  · If your incision was injected with a long acting local anesthetic, it will wear off in 4 to 6 hours.  You can expect to have some pain at this time.  · Treat your pain with the prescribed pain medicine before it wears off.  Do not wait until your pain becomes severe.    · Ask your nurse when you had your last pain medicine, so you know when you can take another one after you get home.    · Your last pain medicine was given at __IV form of Ibuprofen given at 9:15am_and 1 Hydrocodone/Apap at 10:30 am_.    Call your doctor if you have:    · Nausea and vomiting that does not stop  · Fever over 101 degrees F.  · Pain not relieved by pain medication  · If you feel you have to pass urine and you are not able to do so.  · Unusual changes in behavior  · Dizziness  · Hives     If you are not able to reach your doctor, you may call the emergency department.       no

## 2021-10-16 ENCOUNTER — TRANSCRIPTION ENCOUNTER (OUTPATIENT)
Age: 56
End: 2021-10-16

## 2021-10-18 ENCOUNTER — EMERGENCY (EMERGENCY)
Facility: HOSPITAL | Age: 56
LOS: 1 days | Discharge: HOME | End: 2021-10-18
Attending: EMERGENCY MEDICINE
Payer: COMMERCIAL

## 2021-10-18 VITALS
DIASTOLIC BLOOD PRESSURE: 118 MMHG | RESPIRATION RATE: 18 BRPM | HEIGHT: 70 IN | HEART RATE: 76 BPM | SYSTOLIC BLOOD PRESSURE: 173 MMHG | WEIGHT: 225.09 LBS | TEMPERATURE: 99 F | OXYGEN SATURATION: 98 %

## 2021-10-18 VITALS — DIASTOLIC BLOOD PRESSURE: 76 MMHG | SYSTOLIC BLOOD PRESSURE: 136 MMHG

## 2021-10-18 DIAGNOSIS — Z98.890 OTHER SPECIFIED POSTPROCEDURAL STATES: Chronic | ICD-10-CM

## 2021-10-18 PROBLEM — I10 ESSENTIAL (PRIMARY) HYPERTENSION: Chronic | Status: ACTIVE | Noted: 2018-07-18

## 2021-10-18 PROCEDURE — 99283 EMERGENCY DEPT VISIT LOW MDM: CPT

## 2021-10-18 NOTE — ED PROVIDER NOTE - PHYSICAL EXAMINATION
General: NAD, well appearing  HEENT: Normocephalic, EOM intact  Neck: No apparent stiffness or JVD  Pulm: Chest wall symmetric and nontender, lungs clear to ascultation   Cardiac: Regular rate and regular rhythm  Abdomen: Nontender and nondistended  Skin: Skin is warm, dry and intact without rashes or lesions.  Neuro: No apparent motor or sensory deficits  MSK: hip nontender, full strength and ROM  Psych: Alert, oriented, and cooperative General: NAD, well appearing  HEENT: Normocephalic, EOM intact  Neck: No apparent stiffness or JVD  Pulm: Chest wall symmetric and nontender, lungs clear to ascultation   Cardiac: Regular rate and regular rhythm  Abdomen: Nontender and nondistended  Skin: Skin is warm, dry and intact without rashes or lesions.  Neuro: No apparent motor or sensory deficits  MSK: hip nontender, full strength and ROM  Psych: Alert, oriented, and cooperative.

## 2021-10-18 NOTE — ED PROVIDER NOTE - PATIENT PORTAL LINK FT
You can access the FollowMyHealth Patient Portal offered by [facility] by registering at the following website: [email or web address]. By joining [email or web address]’s FollowMyHealth portal, you will also be able to view your health information using other applications (apps) compatible with our system.

## 2021-10-18 NOTE — ED PROVIDER NOTE - NSFOLLOWUPINSTRUCTIONS_ED_ALL_ED_FT
Hip Pain    Hip  pain is very common in adults. The cause of pain is rarely dangerous and the pain often gets better over time. The cause of your pain may not be known and may include strain of muscles or ligaments, degeneration of the spinal disks, or arthritis. Occasionally the pain may radiate down your leg(s). Over-the-counter medicines to reduce pain and inflammation are often the most helpful. Stretching and remaining active frequently helps the healing process.     SEEK IMMEDIATE MEDICAL CARE IF YOU HAVE ANY OF THE FOLLOWING SYMPTOMS: bowel or bladder control problems, unusual weakness or numbness in your arms or legs, nausea or vomiting, abdominal pain, fever, dizziness/lightheadedness. Hip Pain    Hip  pain is very common in adults. The cause of pain is rarely dangerous and the pain often gets better over time. The cause of your pain may not be known and may include strain of muscles or ligaments, degeneration of the spinal disks, or arthritis. Occasionally the pain may radiate down your leg(s). Over-the-counter medicines to reduce pain and inflammation are often the most helpful. Stretching and remaining active frequently helps the healing process.     SEEK IMMEDIATE MEDICAL CARE IF YOU HAVE ANY OF THE FOLLOWING SYMPTOMS: bowel or bladder control problems, unusual weakness or numbness in your arms or legs, nausea or vomiting, abdominal pain, fever, dizziness/lightheadedness.    Hypertension    Hypertension, commonly called high blood pressure, is when the force of blood pumping through your arteries is too strong. Hypertension forces your heart to work harder to pump blood. Your arteries may become narrow or stiff. Having untreated or uncontrolled hypertension for a long period of time can cause heart attack, stroke, kidney disease, and other problems. If started on a medication, take exactly as prescribed by your health care professional. Maintain a healthy lifestyle and follow up with your primary care physician.    SEEK IMMEDIATE MEDICAL CARE IF YOU HAVE ANY OF THE FOLLOWING SYMPTOMS: severe headache, confusion, chest pain, abdominal pain, vomiting, or shortness of breath.

## 2021-10-18 NOTE — ED ADULT TRIAGE NOTE - CHIEF COMPLAINT QUOTE
Patient arrived to ED today with c/o right hip pain that radiates to his right thigh.  Patient reports pain for about a week denies injury.

## 2021-10-18 NOTE — ED PROVIDER NOTE - CLINICAL SUMMARY MEDICAL DECISION MAKING FREE TEXT BOX
56M hx htn, TKR, c/o right hip pain for 1.5 wks, already had appropriate workup and on appropriate management, normal exam, reassured patient, offered referral for ortho/spine since not improving,   Patient/family was given full return precautions.  Patient was counseled on red flag symptoms such as fever, severe pain, or focal deficits and advised to return to the ED for these reasons or any reason that was concerning to them.  Patient/ family advised to make close follow up with their primary care provider and specialty clinics (as applicable) to follow up with this visit and continue investigation/treatment.  All questions were answered. Patient/family has shown adequate competence and understanding. Patient/family is agreeable to the plan. 56M hx htn, TKR, c/o right hip pain for 1.5 wks, already had appropriate workup and on appropriate management, normal exam, reassured patient, offered referral for ortho/spine since not improving,  Patient was told their blood pressure was high in the ED today.  Patient advised to follow up with their PCM regarding this reading.  Patient/family was given full return precautions.  Patient was counseled on red flag symptoms such as fever, severe pain, or focal deficits and advised to return to the ED for these reasons or any reason that was concerning to them.  Patient/ family advised to make close follow up with their primary care provider and specialty clinics (as applicable) to follow up with this visit and continue investigation/treatment.  All questions were answered. Patient/family has shown adequate competence and understanding. Patient/family is agreeable to the plan.

## 2021-10-18 NOTE — ED PROVIDER NOTE - OBJECTIVE STATEMENT
56M hx htn, TKR, c/o right hip pain for 1.5 wks, started happening all the sudden while moving around, already saw PCM with neg xray, on robaxin, NSAIDs, and  steroids, here because pain isn't improving very much, fully ambulatory, Otherwise denies pain, bleeding, SOB, chest pain, abdominal pain or fevers.  Denies other pertinent medical problems.  Denies any substance use. 56 year old M hx htn, TKR, c/o right hip pain for 1.5 wks, started happening all the sudden while moving around, already saw PCM with neg xray, on robaxin, NSAIDs, and  steroids, here because pain isn't improving very much, fully ambulatory, Otherwise denies pain, bleeding, SOB, chest pain, abdominal pain or fevers.  Denies other pertinent medical problems.  Denies any substance use.

## 2021-10-18 NOTE — ED PROVIDER NOTE - CARE PROVIDER_API CALL
Tarun Huston (DO)  Orthopaedic Surgery  [address] [place] [address], [address]  [place] [address]  Phone: [phone]  Fax: [phone]  Follow Up Time: 7-10 Days

## 2021-10-18 NOTE — ED PROVIDER NOTE - NS ED ROS FT
General: No fever, no massive bleeding  Head: No HA, no ongoing scalp bleed  ENT: no neck pain, no sore throat  Resp: No SOB, no hemoptysis  Cardiovascular: No CP, No LOC  GI: No abdominal pain, No blood in stool  : No dysuria, no hematuria   MSK: No back pain, + limb pain  Skin: No painful or bleeding lesions  Neuro: No paresthesias, No focal deficits General: No fever, no massive bleeding  Head: No HA, no ongoing scalp bleed  ENT: no neck pain, no sore throat  Resp: No SOB, no hemoptysis  Cardiovascular: No CP, No LOC  GI: No abdominal pain, No blood in stool  : No dysuria, no hematuria   MSK: No back pain, + limb pain  Skin: No painful or bleeding lesions  Neuro: No paresthesias, No focal deficits.

## 2021-10-18 NOTE — ED PROVIDER NOTE - CARE PLAN
Principal Discharge DX:	Hip pain   1 Principal Discharge DX:	Hip pain  Secondary Diagnosis:	Elevated blood pressure reading with diagnosis of hypertension

## 2022-08-17 NOTE — DISCHARGE NOTE NURSING/CASE MANAGEMENT/SOCIAL WORK - PATIENT PORTAL LINK FT
You can access the FollowMyHealth Patient Portal offered by [facility] by registering at the following website: [email or web address]. By joining [employer]’s FollowMyHealth portal, you will also be able to view your health information using other applications (apps) compatible with our system. Validate Note Data When Using Inventory: Yes

## 2023-08-31 NOTE — ED ADULT NURSE NOTE - INTEGUMENTARY WDL
Orientation to room/Bed in low position, brakes on/Environment clear of unused equipment, furniture's in place, clear of hazards Color consistent with ethnicity/race, warm, dry intact, resilient.
